# Patient Record
Sex: MALE | Race: WHITE | NOT HISPANIC OR LATINO | Employment: OTHER | URBAN - NONMETROPOLITAN AREA
[De-identification: names, ages, dates, MRNs, and addresses within clinical notes are randomized per-mention and may not be internally consistent; named-entity substitution may affect disease eponyms.]

---

## 2020-07-27 ENCOUNTER — OFFICE VISIT (OUTPATIENT)
Dept: URGENT CARE | Facility: PHYSICIAN GROUP | Age: 60
End: 2020-07-27
Payer: MEDICAID

## 2020-07-27 VITALS
OXYGEN SATURATION: 96 % | BODY MASS INDEX: 22.96 KG/M2 | HEART RATE: 88 BPM | RESPIRATION RATE: 16 BRPM | TEMPERATURE: 98.9 F | DIASTOLIC BLOOD PRESSURE: 100 MMHG | WEIGHT: 174 LBS | SYSTOLIC BLOOD PRESSURE: 154 MMHG

## 2020-07-27 DIAGNOSIS — H66.002 NON-RECURRENT ACUTE SUPPURATIVE OTITIS MEDIA OF LEFT EAR WITHOUT SPONTANEOUS RUPTURE OF TYMPANIC MEMBRANE: ICD-10-CM

## 2020-07-27 DIAGNOSIS — H92.02 LEFT EAR PAIN: ICD-10-CM

## 2020-07-27 DIAGNOSIS — H61.22 HEARING LOSS OF LEFT EAR DUE TO CERUMEN IMPACTION: ICD-10-CM

## 2020-07-27 PROCEDURE — 99203 OFFICE O/P NEW LOW 30 MIN: CPT | Mod: 25 | Performed by: PHYSICIAN ASSISTANT

## 2020-07-27 PROCEDURE — 69209 REMOVE IMPACTED EAR WAX UNI: CPT | Performed by: PHYSICIAN ASSISTANT

## 2020-07-27 RX ORDER — AMOXICILLIN 875 MG/1
875 TABLET, COATED ORAL 2 TIMES DAILY
Qty: 20 TAB | Refills: 0 | Status: SHIPPED | OUTPATIENT
Start: 2020-07-27

## 2020-07-27 ASSESSMENT — ENCOUNTER SYMPTOMS
CONSTITUTIONAL NEGATIVE: 1
VOMITING: 0
SORE THROAT: 0
DIZZINESS: 0
HEADACHES: 0
COUGH: 0
RHINORRHEA: 0
CARDIOVASCULAR NEGATIVE: 1
SINUS PAIN: 0
NAUSEA: 0
RESPIRATORY NEGATIVE: 1
ABDOMINAL PAIN: 0
DIARRHEA: 0

## 2020-07-27 ASSESSMENT — PAIN SCALES - GENERAL: PAINLEVEL: 6=MODERATE PAIN

## 2020-07-27 NOTE — PROGRESS NOTES
Subjective:      Major Luis is a 60 y.o. male who presents with Otalgia (L ear )            Otalgia    There is pain in the left ear. This is a new problem. The current episode started in the past 7 days. The problem occurs constantly. The problem has been unchanged. There has been no fever. The fever has been present for less than 1 day. The pain is at a severity of 4/10. The pain is moderate. Associated symptoms include hearing loss. Pertinent negatives include no abdominal pain, coughing, diarrhea, ear discharge, headaches, rash, rhinorrhea, sore throat or vomiting. He has tried NSAIDs for the symptoms. The treatment provided moderate relief.       PMH:  has a past medical history of Dental disorder.  MEDS:   Current Outpatient Medications:   •  hydrocodone-acetaminophen (NORCO) 7.5-325 MG per tablet, Take 1-2 Tabs by mouth every 8 hours as needed. (Patient not taking: Reported on 7/27/2020), Disp: 20 Tab, Rfl: 0  ALLERGIES: No Known Allergies  SURGHX:   Past Surgical History:   Procedure Laterality Date   • SCROTAL EXPLORATION Left 2/24/2016    Procedure: SCROTAL EXPLORATION FOR: EXCISION OF INGUINAL/SCROTAL CYSTIC MASS;  Surgeon: Antonio Parmar M.D.;  Location: SURGERY [facility];  Service:      SOCHX:  reports that he has been smoking cigarettes. He has a 21.00 pack-year smoking history. He does not have any smokeless tobacco history on file. He reports current alcohol use of about 10.5 oz of alcohol per week. He reports that he does not use drugs.  FH: family history is not on file.    Review of Systems   Constitutional: Negative.    HENT: Positive for ear pain and hearing loss. Negative for congestion, ear discharge, rhinorrhea, sinus pain, sore throat and tinnitus.    Respiratory: Negative.  Negative for cough.    Cardiovascular: Negative.    Gastrointestinal: Negative for abdominal pain, diarrhea, nausea and vomiting.   Skin: Negative for rash.   Neurological: Negative for dizziness and  headaches.       Medications, Allergies, and current problem list reviewed today in Epic     Objective:     /100   Pulse 88   Temp 37.2 °C (98.9 °F) (Temporal)   Resp 16   Wt 78.9 kg (174 lb)   SpO2 96%   BMI 22.96 kg/m²      Physical Exam  Vitals signs and nursing note reviewed.   Constitutional:       Appearance: He is well-developed.   HENT:      Right Ear: External ear normal. There is no impacted cerumen. No mastoid tenderness. Tympanic membrane is not injected or erythematous.      Left Ear: External ear normal. Decreased hearing noted. No drainage, swelling or tenderness. There is impacted cerumen. No mastoid tenderness. Tympanic membrane is erythematous and bulging. Tympanic membrane is not injected.      Nose: Nose normal.      Mouth/Throat:      Pharynx: No oropharyngeal exudate.   Eyes:      Pupils: Pupils are equal, round, and reactive to light.   Neck:      Musculoskeletal: Normal range of motion.   Cardiovascular:      Rate and Rhythm: Normal rate and regular rhythm.      Heart sounds: Normal heart sounds. No murmur.   Pulmonary:      Effort: Pulmonary effort is normal. No respiratory distress.      Breath sounds: Normal breath sounds. No wheezing.   Chest:      Chest wall: No tenderness.   Lymphadenopathy:      Head:      Right side of head: No preauricular adenopathy.      Left side of head: No preauricular adenopathy.      Cervical: No cervical adenopathy.   Skin:     General: Skin is warm and dry.   Neurological:      Mental Status: He is alert and oriented to person, place, and time.   Psychiatric:         Behavior: Behavior normal.         Thought Content: Thought content normal.         Judgment: Judgment normal.                 Assessment/Plan:     1. Left ear pain     2. Hearing loss of left ear due to cerumen impaction     3. Non-recurrent acute suppurative otitis media of left ear without spontaneous rupture of tympanic membrane  amoxicillin (AMOXIL) 875 MG tablet     Procedure:  Cerumen Removal  Risks and benefits of procedure discussed  Cerumen removed with curette and lavage after softening agent instilled by me with the help of my MA  Patient tolerated well  Post procedure exam with clear canal and infected TM    Patient presented with left-sided otalgia and decreased hearing.  He was found to have cerumen impaction.  That was removed with lavage.  He was then found to have a acute otitis media of his left ear.  OTC meds and conservative measures as discussed    Return to clinic or go to ED if symptoms worsen or persist. Indications for ED discussed at length. Patient voices understanding. Follow-up with your primary care provider in 3-5 days. Red flags discussed. All side effects of medication discussed including allergic response, GI upset, tendon injury, etc.    Please note that this dictation was created using voice recognition software. I have made every reasonable attempt to correct obvious errors, but I expect that there are errors of grammar and possibly content that I did not discover before finalizing the note.

## 2022-10-21 ENCOUNTER — HOSPITAL ENCOUNTER (OUTPATIENT)
Dept: RADIOLOGY | Facility: MEDICAL CENTER | Age: 62
End: 2022-10-21
Payer: MEDICAID